# Patient Record
Sex: MALE | URBAN - NONMETROPOLITAN AREA
[De-identification: names, ages, dates, MRNs, and addresses within clinical notes are randomized per-mention and may not be internally consistent; named-entity substitution may affect disease eponyms.]

---

## 2020-11-05 ENCOUNTER — APPOINTMENT (RX ONLY)
Dept: URBAN - NONMETROPOLITAN AREA CLINIC 39 | Facility: CLINIC | Age: 75
Setting detail: DERMATOLOGY
End: 2020-11-05

## 2020-11-05 DIAGNOSIS — L87.2 ELASTOSIS PERFORANS SERPIGINOSA: ICD-10-CM

## 2020-11-05 PROBLEM — L30.9 DERMATITIS, UNSPECIFIED: Status: ACTIVE | Noted: 2020-11-05

## 2020-11-05 PROCEDURE — 11102 TANGNTL BX SKIN SINGLE LES: CPT

## 2020-11-05 PROCEDURE — ? PRESCRIPTION

## 2020-11-05 PROCEDURE — ? BIOPSY BY SHAVE METHOD

## 2020-11-05 PROCEDURE — ? ADDITIONAL NOTES

## 2020-11-05 RX ORDER — TRIAMCINOLONE ACETONIDE 1 MG/G
OINTMENT TOPICAL
Qty: 1 | Refills: 3 | Status: CANCELLED | COMMUNITY
Start: 2020-11-05

## 2020-11-05 RX ADMIN — TRIAMCINOLONE ACETONIDE: 1 OINTMENT TOPICAL at 00:00

## 2020-11-05 ASSESSMENT — LOCATION SIMPLE DESCRIPTION DERM: LOCATION SIMPLE: RIGHT UPPER ARM

## 2020-11-05 ASSESSMENT — LOCATION DETAILED DESCRIPTION DERM: LOCATION DETAILED: RIGHT ANTERIOR DISTAL UPPER ARM

## 2020-11-05 ASSESSMENT — LOCATION ZONE DERM: LOCATION ZONE: ARM

## 2020-11-05 NOTE — PROCEDURE: BIOPSY BY SHAVE METHOD
Validate Lesion Size: No
Anesthesia Type: 1% lidocaine with epinephrine and a 1:10 solution of 8.4% sodium bicarbonate
Electrodesiccation And Curettage Text: The wound bed was treated with electrodesiccation and curettage after the biopsy was performed.
Post-Care Instructions: I reviewed with the patient in detail post-care instructions. Patient is to keep the biopsy site dry overnight, then apply petrolatum daily until healed, and apply a new bandage. Patient may apply diluted hydrogen peroxide soaks to remove any crusting.
Depth Of Biopsy: dermis
X Size Of Lesion In Cm: 0
Hemostasis: Drysol
Information: Selecting Yes will display possible errors in your note based on the variables you have selected. This validation is only offered as a suggestion for you. PLEASE NOTE THAT THE VALIDATION TEXT WILL BE REMOVED WHEN YOU FINALIZE YOUR NOTE. IF YOU WANT TO FAX A PRELIMINARY NOTE YOU WILL NEED TO TOGGLE THIS TO 'NO' IF YOU DO NOT WANT IT IN YOUR FAXED NOTE.
Detail Level: Detailed
Consent: Written consent was obtained and risks were reviewed including but not limited to scarring, infection, bleeding, scabbing, incomplete removal, nerve damage and allergy to anesthesia.
Cryotherapy Text: The wound bed was treated with cryotherapy after the biopsy was performed.
Wound Care: Petrolatum
Biopsy Method: Personna blade
Type Of Destruction Used: Curettage
Silver Nitrate Text: The wound bed was treated with silver nitrate after the biopsy was performed.
Notification Instructions: Patient will be notified of biopsy results. However, patient instructed to call the office if not contacted within 2 weeks.
Anesthesia Volume In Cc: 0.5
Dressing: bandage
Biopsy Type: H and E
Curettage Text: The wound bed was treated with curettage after the biopsy was performed.
Render Post-Care Instructions In Note?: yes
Billing Type: Third-Party Bill
Electrodesiccation Text: The wound bed was treated with electrodesiccation after the biopsy was performed.

## 2020-11-05 NOTE — PROCEDURE: ADDITIONAL NOTES
Additional Notes: Patient has chronic renal failure, on Dialysis for six years. Recent diagnosis of thyroid cancer.\\nHas known elevated calcium levels.
Detail Level: Simple

## 2020-11-12 ENCOUNTER — RX ONLY (OUTPATIENT)
Age: 75
Setting detail: RX ONLY
End: 2020-11-12

## 2020-11-12 RX ORDER — TRIAMCINOLONE ACETONIDE 1 MG/G
OINTMENT TOPICAL
Qty: 1 | Refills: 0 | Status: ERX | COMMUNITY
Start: 2020-11-12